# Patient Record
Sex: FEMALE
[De-identification: names, ages, dates, MRNs, and addresses within clinical notes are randomized per-mention and may not be internally consistent; named-entity substitution may affect disease eponyms.]

---

## 2023-04-28 PROBLEM — Z00.00 ENCOUNTER FOR PREVENTIVE HEALTH EXAMINATION: Status: ACTIVE | Noted: 2023-04-28

## 2023-05-01 ENCOUNTER — FORM ENCOUNTER (OUTPATIENT)
Age: 43
End: 2023-05-01

## 2023-05-02 ENCOUNTER — APPOINTMENT (OUTPATIENT)
Dept: ORTHOPEDIC SURGERY | Facility: CLINIC | Age: 43
End: 2023-05-02
Payer: COMMERCIAL

## 2023-05-02 ENCOUNTER — NON-APPOINTMENT (OUTPATIENT)
Age: 43
End: 2023-05-02

## 2023-05-02 ENCOUNTER — APPOINTMENT (OUTPATIENT)
Dept: MRI IMAGING | Facility: CLINIC | Age: 43
End: 2023-05-02
Payer: COMMERCIAL

## 2023-05-02 VITALS — WEIGHT: 170 LBS | HEIGHT: 62 IN | BODY MASS INDEX: 31.28 KG/M2

## 2023-05-02 DIAGNOSIS — S63.642A SPRAIN OF METACARPOPHALANGEAL JOINT OF LEFT THUMB, INITIAL ENCOUNTER: ICD-10-CM

## 2023-05-02 DIAGNOSIS — Z78.9 OTHER SPECIFIED HEALTH STATUS: ICD-10-CM

## 2023-05-02 PROCEDURE — 73140 X-RAY EXAM OF FINGER(S): CPT | Mod: LT

## 2023-05-02 PROCEDURE — 73218 MRI UPPER EXTREMITY W/O DYE: CPT | Mod: LT

## 2023-05-02 PROCEDURE — 99203 OFFICE O/P NEW LOW 30 MIN: CPT

## 2023-05-02 RX ORDER — MELOXICAM 15 MG/1
15 TABLET ORAL
Qty: 30 | Refills: 0 | Status: ACTIVE | COMMUNITY
Start: 2023-05-02 | End: 1900-01-01

## 2023-05-02 NOTE — PHYSICAL EXAM
[de-identified] : L thumb \par MCP swelling \par Tender\par ?instability\par Stiffness\par \par Xrays neg

## 2023-05-02 NOTE — HISTORY OF PRESENT ILLNESS
[Sudden] : sudden [8] : 8 [7] : 7 [Dull/Aching] : dull/aching [Ice] : ice [de-identified] : L thumb pain since thursday \par MCP pain swelling and stiffness [] : no [FreeTextEntry1] : L thumb [FreeTextEntry3] : few weeks ago [FreeTextEntry5] : no injury

## 2023-05-18 ENCOUNTER — NON-APPOINTMENT (OUTPATIENT)
Age: 43
End: 2023-05-18

## 2024-12-16 ENCOUNTER — APPOINTMENT (OUTPATIENT)
Dept: ORTHOPEDIC SURGERY | Facility: CLINIC | Age: 44
End: 2024-12-16
Payer: COMMERCIAL

## 2024-12-16 VITALS — WEIGHT: 170 LBS | HEIGHT: 62 IN | BODY MASS INDEX: 31.28 KG/M2

## 2024-12-16 DIAGNOSIS — Z00.00 ENCOUNTER FOR GENERAL ADULT MEDICAL EXAMINATION W/OUT ABNORMAL FINDINGS: ICD-10-CM

## 2024-12-16 DIAGNOSIS — M23.92 UNSPECIFIED INTERNAL DERANGEMENT OF LEFT KNEE: ICD-10-CM

## 2024-12-16 PROCEDURE — 73564 X-RAY EXAM KNEE 4 OR MORE: CPT | Mod: LT

## 2024-12-16 PROCEDURE — 20611 DRAIN/INJ JOINT/BURSA W/US: CPT | Mod: LT

## 2024-12-16 PROCEDURE — 99204 OFFICE O/P NEW MOD 45 MIN: CPT | Mod: 25

## 2024-12-16 PROCEDURE — J3490M: CUSTOM

## 2024-12-18 ENCOUNTER — APPOINTMENT (OUTPATIENT)
Dept: MRI IMAGING | Facility: CLINIC | Age: 44
End: 2024-12-18
Payer: COMMERCIAL

## 2024-12-18 PROCEDURE — 73721 MRI JNT OF LWR EXTRE W/O DYE: CPT | Mod: LT

## 2024-12-20 PROBLEM — M65.969 SYNOVITIS OF KNEE: Status: ACTIVE | Noted: 2024-12-20

## 2024-12-20 PROBLEM — S83.232A COMPLEX TEAR OF MEDIAL MENISCUS OF LEFT KNEE AS CURRENT INJURY, INITIAL ENCOUNTER: Status: ACTIVE | Noted: 2024-12-20

## 2024-12-23 ENCOUNTER — APPOINTMENT (OUTPATIENT)
Dept: ORTHOPEDIC SURGERY | Facility: CLINIC | Age: 44
End: 2024-12-23
Payer: COMMERCIAL

## 2024-12-23 ENCOUNTER — NON-APPOINTMENT (OUTPATIENT)
Age: 44
End: 2024-12-23

## 2024-12-23 DIAGNOSIS — S83.232A COMPLEX TEAR OF MEDIAL MENISCUS, CURRENT INJURY, LEFT KNEE, INITIAL ENCOUNTER: ICD-10-CM

## 2024-12-23 DIAGNOSIS — M65.969 UNSP SYNOVITIS AND TENOSYNOVITIS, UNSPECIFIED LOWER LEG: ICD-10-CM

## 2024-12-23 PROCEDURE — 99214 OFFICE O/P EST MOD 30 MIN: CPT

## 2024-12-23 PROCEDURE — 99204 OFFICE O/P NEW MOD 45 MIN: CPT

## 2025-02-20 ENCOUNTER — APPOINTMENT (OUTPATIENT)
Dept: ORTHOPEDIC SURGERY | Facility: CLINIC | Age: 45
End: 2025-02-20
Payer: COMMERCIAL

## 2025-02-20 DIAGNOSIS — M65.969 UNSP SYNOVITIS AND TENOSYNOVITIS, UNSPECIFIED LOWER LEG: ICD-10-CM

## 2025-02-20 DIAGNOSIS — S83.232A COMPLEX TEAR OF MEDIAL MENISCUS, CURRENT INJURY, LEFT KNEE, INITIAL ENCOUNTER: ICD-10-CM

## 2025-02-20 PROCEDURE — 99214 OFFICE O/P EST MOD 30 MIN: CPT

## 2025-02-20 RX ORDER — MELOXICAM 15 MG/1
15 TABLET ORAL
Qty: 30 | Refills: 0 | Status: ACTIVE | COMMUNITY
Start: 2025-02-20 | End: 1900-01-01

## 2025-03-13 ENCOUNTER — APPOINTMENT (OUTPATIENT)
Dept: ORTHOPEDIC SURGERY | Facility: CLINIC | Age: 45
End: 2025-03-13
Payer: COMMERCIAL

## 2025-03-13 VITALS — HEIGHT: 62 IN | BODY MASS INDEX: 32.2 KG/M2 | WEIGHT: 175 LBS

## 2025-03-13 DIAGNOSIS — M65.969 UNSP SYNOVITIS AND TENOSYNOVITIS, UNSPECIFIED LOWER LEG: ICD-10-CM

## 2025-03-13 PROCEDURE — 99214 OFFICE O/P EST MOD 30 MIN: CPT

## 2025-03-14 ENCOUNTER — NON-APPOINTMENT (OUTPATIENT)
Age: 45
End: 2025-03-14

## 2025-03-21 ENCOUNTER — APPOINTMENT (OUTPATIENT)
Age: 45
End: 2025-03-21
Payer: COMMERCIAL

## 2025-03-21 PROCEDURE — 29875 ARTHRS KNEE SURG SYNVCT LMTD: CPT | Mod: LT

## 2025-03-21 PROCEDURE — 29881 ARTHRS KNE SRG MNISECTMY M/L: CPT | Mod: AS,LT

## 2025-03-21 PROCEDURE — 29881 ARTHRS KNE SRG MNISECTMY M/L: CPT | Mod: LT

## 2025-03-21 PROCEDURE — 29875 ARTHRS KNEE SURG SYNVCT LMTD: CPT | Mod: AS,LT

## 2025-03-21 RX ORDER — IBUPROFEN 600 MG/1
600 TABLET ORAL EVERY 6 HOURS
Qty: 20 | Refills: 0 | Status: ACTIVE | COMMUNITY
Start: 2025-03-21 | End: 1900-01-01

## 2025-03-21 RX ORDER — ACETAMINOPHEN 500 MG/1
500 TABLET ORAL 3 TIMES DAILY
Qty: 90 | Refills: 0 | Status: ACTIVE | COMMUNITY
Start: 2025-03-21 | End: 1900-01-01

## 2025-03-21 RX ORDER — OXYCODONE 5 MG/1
5 TABLET ORAL EVERY 6 HOURS
Qty: 10 | Refills: 0 | Status: ACTIVE | COMMUNITY
Start: 2025-03-21 | End: 1900-01-01

## 2025-03-21 RX ORDER — PROMETHAZINE HYDROCHLORIDE 12.5 MG/1
12.5 TABLET ORAL EVERY 6 HOURS
Qty: 20 | Refills: 0 | Status: ACTIVE | COMMUNITY
Start: 2025-03-21 | End: 1900-01-01

## 2025-03-23 PROBLEM — S83.232A COMPLEX TEAR OF MEDIAL MENISCUS OF LEFT KNEE AS CURRENT INJURY, INITIAL ENCOUNTER: Status: RESOLVED | Noted: 2024-12-20 | Resolved: 2025-03-23

## 2025-03-23 PROBLEM — S83.232D COMPLEX TEAR OF MEDIAL MENISCUS OF LEFT KNEE, SUBSEQUENT ENCOUNTER: Status: ACTIVE | Noted: 2025-03-23

## 2025-03-27 ENCOUNTER — NON-APPOINTMENT (OUTPATIENT)
Age: 45
End: 2025-03-27

## 2025-03-27 ENCOUNTER — APPOINTMENT (OUTPATIENT)
Dept: ORTHOPEDIC SURGERY | Facility: CLINIC | Age: 45
End: 2025-03-27
Payer: COMMERCIAL

## 2025-03-27 DIAGNOSIS — S83.232A COMPLEX TEAR OF MEDIAL MENISCUS, CURRENT INJURY, LEFT KNEE, INITIAL ENCOUNTER: ICD-10-CM

## 2025-03-27 DIAGNOSIS — S83.232D COMPLEX TEAR OF MEDIAL MENISCUS, CURRENT INJURY, LEFT KNEE, SUBSEQUENT ENCOUNTER: ICD-10-CM

## 2025-03-27 PROCEDURE — 99024 POSTOP FOLLOW-UP VISIT: CPT

## 2025-05-14 ENCOUNTER — APPOINTMENT (OUTPATIENT)
Dept: ORTHOPEDIC SURGERY | Facility: CLINIC | Age: 45
End: 2025-05-14
Payer: COMMERCIAL

## 2025-05-14 ENCOUNTER — NON-APPOINTMENT (OUTPATIENT)
Age: 45
End: 2025-05-14

## 2025-05-14 VITALS — WEIGHT: 175 LBS | BODY MASS INDEX: 32.2 KG/M2 | HEIGHT: 62 IN

## 2025-05-14 DIAGNOSIS — S83.232D COMPLEX TEAR OF MEDIAL MENISCUS, CURRENT INJURY, LEFT KNEE, SUBSEQUENT ENCOUNTER: ICD-10-CM

## 2025-05-14 PROCEDURE — 99024 POSTOP FOLLOW-UP VISIT: CPT

## 2025-08-25 ENCOUNTER — NON-APPOINTMENT (OUTPATIENT)
Age: 45
End: 2025-08-25